# Patient Record
Sex: FEMALE | Race: WHITE | NOT HISPANIC OR LATINO | URBAN - METROPOLITAN AREA
[De-identification: names, ages, dates, MRNs, and addresses within clinical notes are randomized per-mention and may not be internally consistent; named-entity substitution may affect disease eponyms.]

---

## 2022-10-27 ENCOUNTER — OFFICE VISIT (OUTPATIENT)
Dept: OBGYN CLINIC | Facility: CLINIC | Age: 27
End: 2022-10-27

## 2022-10-27 VITALS
WEIGHT: 211.6 LBS | HEIGHT: 63 IN | BODY MASS INDEX: 37.49 KG/M2 | SYSTOLIC BLOOD PRESSURE: 104 MMHG | DIASTOLIC BLOOD PRESSURE: 80 MMHG

## 2022-10-27 DIAGNOSIS — Z80.3 FAMILY HISTORY OF BREAST CANCER: ICD-10-CM

## 2022-10-27 DIAGNOSIS — Z87.42 HISTORY OF ABNORMAL CERVICAL PAP SMEAR: ICD-10-CM

## 2022-10-27 DIAGNOSIS — Z01.419 ROUTINE GYNECOLOGICAL EXAMINATION: Primary | ICD-10-CM

## 2022-10-27 NOTE — PROGRESS NOTES
Assessment   33 y o  Melida Bello presenting for annual exam      Plan   Diagnoses and all orders for this visit:    Routine gynecological examination  Normal findings on routine exam   Encouraged 150 min of exercise per week  Reviewed balanced diet  Breast awareness/SBE encouraged     Condoms recommended for pregnancy prevention  Declines alternate method at this time  Prenatal vitamin encouraged if planning to continue withdrawal      Family history of breast cancer  Mom with breast cancer at age 48 and recurrence at age 61  Currently stage 4  She has had negative genetic testing  Breast awareness and SBE encouraged  History of abnormal pap  10/26/21 NILM  9/11/2020 NILM  6/21/2019 NILM  3/2019 ASCUS/HPV neg  11/13/2018 NILM  7/17/2018 - colposcopy - neg  6/18/2018 LSIL  6/12/2017 NILM    Had Gardasil  Nonsmoker  Pap due 2024    Mammogram - routine screening at age 37    RTO one year for yearly exam or sooner as needed  __________________________________________________________________    Subjective     Shiva Leavitt is a 33 y o  Melida Bello presenting for annual exam  She is without complaint and does not want STD testing today  Reva Ramirez 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  She is from [place]  Moved to [facility] where her boyfriend is from  They have been together x 1 year  Her mother was unfortunately found to have a recurrence of breast cancer - currently Stage 4  SCREENING  Last Pap: 10/27/2022 NILM  Last Mammo: n/a  Last Colonoscopy: n/a    GYN  Periods are q 30 days, lasting 2 days  Not heavy  Dysmenorrhea:moderate, occurring premenstrually  Not requiring tx     Cyclic symptoms include breast tenderness and moodiness    Sexually active: Yes - single partner - male  Contraception: withdrawal   Hx STI: denies     Hx Abnormal pap:   10/26/21 NILM  9/11/2020 NILM  6/21/2019 NILM  3/2019 ASCUS/HPV neg  11/13/2018 NILM  7/17/2018 - colposcopy - neg  2018 LSIL  2017 NILM  We reviewed ASCCP guidelines for Pap testing today  Gardasil: She has completed the Gardasil series  Denies vaginal discharge, itching, odor, dyspareunia, pelvic pain and vulvar/vaginal symptoms      OB         Complaints: denies   Denies urgency, frequency, hematuria, leakage / change in stream, difficulty urinating  BREAST  Complaints: denies   Denies: breast lump, breast tenderness, nipple discharge, skin color change, and skin lesion(s)  Personal hx: denies      Pertinent Family Hx:   Family hx of breast cancer: Mom (age 48 - then recurrence at 61)  Family hx of ovarian cancer: no  Family hx of colon cancer: no      GENERAL  PMH reviewed/updated and is as below  Past Medical History:   Diagnosis Date   • Abnormal Pap smear of cervix        History reviewed  No pertinent surgical history  No current outpatient medications on file      No Known Allergies    Social History     Socioeconomic History   • Marital status: Single     Spouse name: Not on file   • Number of children: Not on file   • Years of education: Not on file   • Highest education level: Not on file   Occupational History   • Not on file   Tobacco Use   • Smoking status: Never Smoker   • Smokeless tobacco: Never Used   Substance and Sexual Activity   • Alcohol use: Yes     Comment: Drink maybe once a month   • Drug use: Never   • Sexual activity: Yes     Partners: Male     Birth control/protection: None   Other Topics Concern   • Not on file   Social History Narrative   • Not on file     Social Determinants of Health     Financial Resource Strain: Not on file   Food Insecurity: Not on file   Transportation Needs: Not on file   Physical Activity: Not on file   Stress: Not on file   Social Connections: Not on file   Intimate Partner Violence: Not on file   Housing Stability: Not on file       Review of Systems     ROS:  Constitutional: Negative for fatigue and unexpected weight change  Respiratory: Negative for cough and shortness of breath  Cardiovascular: Negative for chest pain and palpitations  Gastrointestinal: Negative for abdominal pain and change in bowel habits  Breasts:  Negative, other than as noted above  Genitourinary: Negative, other than as noted above  Psychiatric: Negative for mood difficulties  Objective      /80 (BP Location: Left arm, Patient Position: Sitting, Cuff Size: Large)   Ht 5' 2 5" (1 588 m)   Wt 96 kg (211 lb 9 6 oz)   LMP 10/09/2022   BMI 38 09 kg/m²     Physical Examination:    Patient appears well and is not in distress  Neck is supple without masses  Breasts are symmetrical without mass, tenderness, nipple discharge, skin changes or adenopathy  Abdomen is soft and nontender without masses  External genitals are normal without lesions or rashes  Urethral meatus and urethra are normal  Bladder is normal to palpation  Vagina is normal without discharge or bleeding  Cervix is normal without discharge or lesion  Uterus is normal, mobile, nontender without palpable mass  Adnexa are normal, nontender, without palpable mass

## 2024-04-18 NOTE — PROGRESS NOTES
Assessment/Plan:    Encounter for gynecological examination (general) (routine) without abnormal findings  28 yo G0, new to [facility], here for overdue well check and no menses since Jan. H/o irregular menses.  Same partner x 2 years, open to pregnancy.   No other complaints    Normal breast and pelvic exams. Pap done.   Mammo at 41, SBE reviewed.   Will check TSH, PRL, HCG and rx 5 days of provera when resulted.     Family history of breast cancer - Mother @53, reoccurenc. Mother BRCA neg  Will attempt to upload mother's genetic report for review.   SBE.   Mammo @41, prn.  Could consider q 6 month breast exam in future.       Diagnoses and all orders for this visit:    Encounter for gynecological examination (general) (routine) without abnormal findings    Amenorrhea  -     POCT urine HCG  -     TSH + Free T4; Future  -     hCG, quantitative; Future  -     Prolactin; Future  -     TSH + Free T4  -     hCG, quantitative  -     Prolactin    Screening for malignant neoplasm of the cervix  -     IGP, rfx Aptima HPV ASCU    Other orders  -     Liquid-based pap, screening          Subjective:      Patient ID: Divya Scott is a 29 y.o. female.    HPI here for well check, amenorrheic.    The following portions of the patient's history were reviewed and updated as appropriate: She  has a past medical history of Abnormal Pap smear of cervix and Gardasil complete.  She   Patient Active Problem List    Diagnosis Date Noted    Encounter for gynecological examination (general) (routine) without abnormal findings 04/22/2024    Family history of breast cancer - Mother @53, reoccurenc. Mother BRCA neg 10/27/2022    Amblyopia 08/13/2003     She  has a past surgical history that includes Ankle surgery (Right).  Her family history includes BRCA1 Negative in her mother; Breast cancer (age of onset: 53) in her mother; Diabetes in her maternal grandmother; Heart disease in her maternal grandfather; Migraines in her mother; Stroke in her  paternal grandmother; Thyroid disease in her father and mother.  She  reports that she has never smoked. She has never used smokeless tobacco. She reports current alcohol use. She reports that she does not use drugs.  No current outpatient medications on file.     No current facility-administered medications for this visit.     She is allergic to camphor, diphenhydramine-pe-apap, and diphenhydramine-zinc acetate..    Review of Systems  amenorrhea, irregular cycles.   No breast, bladder, bowel changes. No new persistent pain, bloating, early satiety or pelvic pressure  No androgen excess symptoms    Objective:      /70 (BP Location: Right arm, Patient Position: Sitting, Cuff Size: Large)   Wt 97.7 kg (215 lb 6.4 oz)   BMI 38.77 kg/m²     Declined chaperone       Physical Exam    General appearance: no distress, pleasant  Neck: thyroid without nodules or thyromegaly, no palpable adenopathy  Lymph nodes: no palpable adenopathy  Breasts: pendulous, no masses, nodes or skin changes  Abdomen: soft, non tender, no palpable masses  Pelvic exam: normal external genitalia, urethral meatus normal, vagina without lesions, cervix without lesions, uterus small, non tender, no adnexal masses, non tender  Rectal exam: deferred

## 2024-04-22 ENCOUNTER — OFFICE VISIT (OUTPATIENT)
Dept: OBGYN CLINIC | Facility: CLINIC | Age: 29
End: 2024-04-22
Payer: COMMERCIAL

## 2024-04-22 VITALS — DIASTOLIC BLOOD PRESSURE: 70 MMHG | WEIGHT: 215.4 LBS | BODY MASS INDEX: 38.77 KG/M2 | SYSTOLIC BLOOD PRESSURE: 110 MMHG

## 2024-04-22 DIAGNOSIS — Z12.4 SCREENING FOR MALIGNANT NEOPLASM OF THE CERVIX: ICD-10-CM

## 2024-04-22 DIAGNOSIS — Z80.3 FAMILY HISTORY OF BREAST CANCER: ICD-10-CM

## 2024-04-22 DIAGNOSIS — Z01.419 ENCOUNTER FOR GYNECOLOGICAL EXAMINATION (GENERAL) (ROUTINE) WITHOUT ABNORMAL FINDINGS: Primary | ICD-10-CM

## 2024-04-22 DIAGNOSIS — N91.2 AMENORRHEA: ICD-10-CM

## 2024-04-22 LAB — SL AMB POCT URINE HCG: NORMAL

## 2024-04-22 PROCEDURE — 81025 URINE PREGNANCY TEST: CPT | Performed by: OBSTETRICS & GYNECOLOGY

## 2024-04-22 PROCEDURE — S0612 ANNUAL GYNECOLOGICAL EXAMINA: HCPCS | Performed by: OBSTETRICS & GYNECOLOGY

## 2024-04-22 NOTE — PATIENT INSTRUCTIONS
Return to office in one year unless having any problems such as breast changes, bleeding, new persistent pain, new progressive bloating, new problems eating (getting full to quickly) or new constant urinary pressure that does not resolve in one week.    You should have a cycle at least every 3 months, if you don't call the office.   Please try to get your mother's genetic report and send it to me.   When your labs are back, we can send an rx for the medication to make your period start.

## 2024-04-22 NOTE — ASSESSMENT & PLAN NOTE
Will attempt to upload mother's genetic report for review.   SBE.   Mammo @41, prn.  Could consider q 6 month breast exam in future.

## 2024-04-22 NOTE — ASSESSMENT & PLAN NOTE
28 yo G0, new to [place], here for overdue well check and no menses since Jan. H/o irregular menses.  Same partner x 2 years, open to pregnancy.   No other complaints    Normal breast and pelvic exams. Pap done.   Mammo at 41, SBE reviewed.   Will check TSH, PRL, HCG and rx 5 days of provera when resulted.

## 2024-04-26 LAB
CYTOLOGIST CVX/VAG CYTO: NORMAL
DX ICD CODE: NORMAL
OTHER STN SPEC: NORMAL
OTHER STN SPEC: NORMAL
PATH REPORT.FINAL DX SPEC: NORMAL
SL AMB NOTE:: NORMAL
SL AMB SPECIMEN ADEQUACY: NORMAL
SL AMB TEST METHODOLOGY: NORMAL

## 2024-05-07 ENCOUNTER — TELEPHONE (OUTPATIENT)
Dept: OBGYN CLINIC | Facility: CLINIC | Age: 29
End: 2024-05-07

## 2024-05-07 DIAGNOSIS — N91.2 AMENORRHEA: Primary | ICD-10-CM

## 2024-05-07 LAB
HCG INTACT+B SERPL-ACNC: <1 MIU/ML
PROLACTIN SERPL-MCNC: 12.9 NG/ML (ref 4.8–33.4)
T4 FREE SERPL DIALY-MCNC: 1.1 NG/DL
TSH SERPL-ACNC: 1.3 UU/ML

## 2024-05-07 RX ORDER — MEDROXYPROGESTERONE ACETATE 5 MG/1
5 TABLET ORAL DAILY
Qty: 5 TABLET | Refills: 0 | Status: SHIPPED | OUTPATIENT
Start: 2024-05-07

## 2024-05-07 NOTE — TELEPHONE ENCOUNTER
Please call patient with normal blood work - negative HCG, normal TSH and prolactin. Provera 5 mg daily for 5 days sent to pharmacy as discussed.   If no period 5-7 days after last pill should call office.   Can cycle every 3 months if no spontaneous menses. Should contact office for plan if needed.   Thanks.

## 2024-05-07 NOTE — TELEPHONE ENCOUNTER
Patient did not answer return call.  Left detailed message with all results and with Rx instructions.  Advised call back if questions.

## 2024-05-22 PROBLEM — Z01.419 ENCOUNTER FOR GYNECOLOGICAL EXAMINATION (GENERAL) (ROUTINE) WITHOUT ABNORMAL FINDINGS: Status: RESOLVED | Noted: 2024-04-22 | Resolved: 2024-05-22

## 2024-10-09 ENCOUNTER — OFFICE VISIT (OUTPATIENT)
Dept: FAMILY MEDICINE CLINIC | Facility: HOSPITAL | Age: 29
End: 2024-10-09
Payer: COMMERCIAL

## 2024-10-09 VITALS
DIASTOLIC BLOOD PRESSURE: 76 MMHG | HEART RATE: 75 BPM | OXYGEN SATURATION: 97 % | BODY MASS INDEX: 39.01 KG/M2 | HEIGHT: 62 IN | SYSTOLIC BLOOD PRESSURE: 130 MMHG | WEIGHT: 212 LBS

## 2024-10-09 DIAGNOSIS — R21 FACIAL RASH: Primary | ICD-10-CM

## 2024-10-09 DIAGNOSIS — Z76.89 ESTABLISHING CARE WITH NEW DOCTOR, ENCOUNTER FOR: ICD-10-CM

## 2024-10-09 DIAGNOSIS — Z13.0 SCREENING FOR IRON DEFICIENCY ANEMIA: ICD-10-CM

## 2024-10-09 DIAGNOSIS — Z11.59 NEED FOR HEPATITIS C SCREENING TEST: ICD-10-CM

## 2024-10-09 DIAGNOSIS — Z13.1 SCREENING FOR DIABETES MELLITUS: ICD-10-CM

## 2024-10-09 DIAGNOSIS — Z80.3 FAMILY HISTORY OF BREAST CANCER: ICD-10-CM

## 2024-10-09 DIAGNOSIS — Z11.4 SCREENING FOR HIV (HUMAN IMMUNODEFICIENCY VIRUS): ICD-10-CM

## 2024-10-09 DIAGNOSIS — R76.8 ELEVATED ANTINUCLEAR ANTIBODY (ANA) LEVEL: ICD-10-CM

## 2024-10-09 DIAGNOSIS — R79.82 ELEVATED C-REACTIVE PROTEIN (CRP): ICD-10-CM

## 2024-10-09 DIAGNOSIS — Z13.220 SCREENING FOR HYPERLIPIDEMIA: ICD-10-CM

## 2024-10-09 PROBLEM — S82.891A CLOSED RIGHT ANKLE FRACTURE: Status: RESOLVED | Noted: 2024-10-09 | Resolved: 2024-10-09

## 2024-10-09 PROBLEM — R87.610 ATYPICAL SQUAMOUS CELLS OF UNDETERMINED SIGNIFICANCE (ASCUS) ON PAPANICOLAOU SMEAR OF CERVIX: Status: RESOLVED | Noted: 2019-03-25 | Resolved: 2024-10-09

## 2024-10-09 PROBLEM — S82.891A CLOSED RIGHT ANKLE FRACTURE: Status: ACTIVE | Noted: 2024-10-09

## 2024-10-09 PROBLEM — B97.7 HUMAN PAPILLOMA VIRUS INFECTION: Status: RESOLVED | Noted: 2018-07-24 | Resolved: 2024-10-09

## 2024-10-09 PROBLEM — R87.610 ATYPICAL SQUAMOUS CELLS OF UNDETERMINED SIGNIFICANCE (ASCUS) ON PAPANICOLAOU SMEAR OF CERVIX: Status: ACTIVE | Noted: 2019-03-25

## 2024-10-09 PROBLEM — R87.612 LOW GRADE SQUAMOUS INTRAEPITHELIAL LESION (LGSIL) ON PAPANICOLAOU SMEAR OF CERVIX: Status: ACTIVE | Noted: 2018-07-05

## 2024-10-09 PROBLEM — B97.7 HUMAN PAPILLOMA VIRUS INFECTION: Status: ACTIVE | Noted: 2018-07-24

## 2024-10-09 PROBLEM — R87.612 LOW GRADE SQUAMOUS INTRAEPITHELIAL LESION (LGSIL) ON PAPANICOLAOU SMEAR OF CERVIX: Status: RESOLVED | Noted: 2018-07-05 | Resolved: 2024-10-09

## 2024-10-09 PROCEDURE — 99204 OFFICE O/P NEW MOD 45 MIN: CPT | Performed by: STUDENT IN AN ORGANIZED HEALTH CARE EDUCATION/TRAINING PROGRAM

## 2024-10-09 NOTE — PROGRESS NOTES
[facility]   Viridiana Ferrell DO    Assessment/Plan:      Diagnosis ICD-10-CM Associated Orders   1. Facial rash  R21 Ambulatory Referral to Dermatology      2. Establishing care with new doctor, encounter for  Z76.89       3. Family history of breast cancer - Mother @53, reoccurenc. Mother BRCA neg  Z80.3       4. Elevated C-reactive protein (CRP)  R79.82 High sensitivity CRP     High sensitivity CRP      5. Screening for iron deficiency anemia  Z13.0 CBC     CBC      6. Screening for diabetes mellitus  Z13.1 Comprehensive metabolic panel     Comprehensive metabolic panel      7. Screening for hyperlipidemia  Z13.220 Lipid Panel with Direct LDL reflex     Lipid Panel with Direct LDL reflex      8. Elevated antinuclear antibody (MASSIEL) level  R76.8 MASSIEL w/Reflex     MASSIEL w/Reflex      9. Need for hepatitis C screening test  Z11.59 Hepatitis C antibody     Hepatitis C antibody      10. Screening for HIV (human immunodeficiency virus)  Z11.4 HIV 1/2 AG/AB W REFLEX [facility] only     HIV 1/2 AG/AB W REFLEX [facility] only        Established here today. Can return as needed for acute issues.   Not getting the more red rash right now. Never seen by derm, ref given.   Re-assess labwork, including CRP & MASSIEL which were elevated before. No autoimmune fam hx.   Return in about 3 months (around 1/9/2025) for Yrly Physical.  Patient may call or return to office with any questions or concerns.   ______________________________________________________________________  Subjective:     Patient ID: Divya Scott is a 29 y.o. female.  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   From [place] & BF from [place].     R ankle Fx June 2023 - surgery in June for hardware & then in Nov for removal & achilles lengthening.    [facility]     Seeing [facility] - irregular menses,  and never needed med for menses.   Getting more regular now.     Seen by rheum for redness in face. Worse in afternoon, can be sitting or resting & it just happens. Gets warm & red, rest of her feels normal.   No fam hx of autoimmune conditions.   Never seen by derm.     Labs done with [facility] in 2023 - CRP 5.1, MASSIEL +  SSA/SSB, RF, ESR, CCP all negative.   Uric acid 5.4  TSH & t4 normal in spring.     Wt Readings from Last 3 Encounters:   10/09/24 96.2 kg (212 lb)   04/22/24 97.7 kg (215 lb 6.4 oz)   10/27/22 96 kg (211 lb 9.6 oz)     Temp Readings from Last 3 Encounters:   No data found for Temp     BP Readings from Last 3 Encounters:   10/09/24 130/76   04/22/24 110/70   10/27/22 104/80     Pulse Readings from Last 3 Encounters:   10/09/24 75      The following portions of the patient's history were reviewed and updated as appropriate: allergies, current medications, past medical history, and problem list.    Review of Systems   Constitutional:  Negative for chills and fever.   Respiratory:  Positive for cough (feels like she is getting over a cold). Negative for shortness of breath.    Cardiovascular:  Negative for chest pain and palpitations.   Skin:  Positive for rash.   Neurological:  Negative for dizziness, light-headedness and headaches.       Objective:      Vitals:    10/09/24 1618   BP: 130/76   Pulse: 75   SpO2: 97%      Physical Exam  Vitals and nursing note reviewed.   Constitutional:       General: She is not in acute distress.     Appearance: Normal appearance. She is obese. She is not ill-appearing.   HENT:      Head: Normocephalic and atraumatic.   Eyes:      General: No scleral icterus.        Right eye: No discharge.         Left eye: No discharge.   Cardiovascular:      Rate and Rhythm: Normal rate and regular rhythm.      Pulses: Normal pulses.      Heart sounds: Normal heart sounds. No murmur heard.  Pulmonary:      Effort: Pulmonary effort is normal. No respiratory distress.      Breath sounds:  "Normal breath sounds. No stridor. No wheezing.   Musculoskeletal:      Cervical back: Normal range of motion and neck supple. No rigidity or tenderness.      Right lower leg: No edema.      Left lower leg: No edema.   Lymphadenopathy:      Cervical: No cervical adenopathy.   Skin:     Findings: Rash (light pink over b/l cheeks, small areas of acne on full face, but very minimal) present.   Neurological:      Mental Status: She is alert and oriented to person, place, and time.      Gait: Gait normal.   Psychiatric:         Mood and Affect: Mood normal.         Behavior: Behavior normal.         Thought Content: Thought content normal.         Judgment: Judgment normal.           Portions of the record may have been created with voice recognition software. Occasional wrong word or \"sound alike\" substitutions may have occurred due to the inherent limitations of voice recognition software. Please review the chart carefully and recognize, using context, where substitutions/typographical errors may have occurred.     "

## 2025-01-13 ENCOUNTER — OFFICE VISIT (OUTPATIENT)
Dept: FAMILY MEDICINE CLINIC | Facility: HOSPITAL | Age: 30
End: 2025-01-13
Payer: COMMERCIAL

## 2025-01-13 VITALS
WEIGHT: 216.2 LBS | DIASTOLIC BLOOD PRESSURE: 70 MMHG | HEART RATE: 84 BPM | TEMPERATURE: 97.8 F | OXYGEN SATURATION: 99 % | BODY MASS INDEX: 38.31 KG/M2 | HEIGHT: 63 IN | SYSTOLIC BLOOD PRESSURE: 110 MMHG

## 2025-01-13 DIAGNOSIS — B30.9 ACUTE VIRAL CONJUNCTIVITIS OF RIGHT EYE: Primary | ICD-10-CM

## 2025-01-13 PROCEDURE — 99213 OFFICE O/P EST LOW 20 MIN: CPT

## 2025-01-13 RX ORDER — KETOTIFEN FUMARATE 0.35 MG/ML
1 SOLUTION/ DROPS OPHTHALMIC 2 TIMES DAILY PRN
Qty: 5 ML | Refills: 0 | Status: SHIPPED | OUTPATIENT
Start: 2025-01-13

## 2025-01-13 RX ORDER — OXYMETAZOLINE HYDROCHLORIDE 30 G/1
CREAM TOPICAL
COMMUNITY

## 2025-01-13 NOTE — PROGRESS NOTES
"Name: Divya Scott      : 1995      MRN: [id number]  Encounter Provider: Boone Baum MD  Encounter Date: 2025   Encounter department: [facility] [address]  :  Assessment & Plan  Acute viral conjunctivitis of right eye  Erythema noted on right medial conjunctiva as well as left medial conjunctiva, no purulent discharge, will treat symptomatically for viral conjunctivitis, no concern for bacterial conjunctivitis at this time.  Advised to go to ED if she develops any signs significant eye pain or changes in her vision.  Kennae handout provided  Orders:    Ketotifen Fumarate (ZADITOR) 0.035 % ophthalmic solution; Administer 1 drop to both eyes 2 (two) times a day as needed (eye irritation)           History of Present Illness     HPI  Pink eye, right, started yesterday, spreading at , denies purulent discharge, visual changes or significant eye pain beyond irritation    Review of Systems   Constitutional:  Negative for chills and fever.   Eyes:  Positive for redness and itching. Negative for photophobia, pain, discharge and visual disturbance.       Objective   /70   Pulse 84   Temp 97.8 °F (36.6 °C) (Oral)   Ht 5' 2.5\" (1.588 m)   Wt 98.1 kg (216 lb 3.2 oz)   SpO2 99%   BMI 38.91 kg/m²      Physical Exam  Constitutional:       General: She is not in acute distress.     Appearance: She is not ill-appearing, toxic-appearing or diaphoretic.   Eyes:      General:         Right eye: No discharge.         Left eye: No discharge.      Comments: Bilateral erythematous conjunctiva without discharge   Neurological:      Mental Status: She is alert and oriented to person, place, and time.   Psychiatric:         Mood and Affect: Mood normal.         Behavior: Behavior normal.         "

## 2025-01-29 ENCOUNTER — RA CDI HCC (OUTPATIENT)
Dept: OTHER | Facility: HOSPITAL | Age: 30
End: 2025-01-29

## 2025-02-03 LAB
ALBUMIN SERPL-MCNC: 4.5 G/DL (ref 4–5)
ALP SERPL-CCNC: 87 IU/L (ref 44–121)
ALT SERPL-CCNC: 14 IU/L (ref 0–32)
ANA SER QL: NEGATIVE
AST SERPL-CCNC: 15 IU/L (ref 0–40)
BILIRUB SERPL-MCNC: 0.7 MG/DL (ref 0–1.2)
BUN SERPL-MCNC: 9 MG/DL (ref 6–20)
BUN/CREAT SERPL: 14 (ref 9–23)
CALCIUM SERPL-MCNC: 9.4 MG/DL (ref 8.7–10.2)
CHLORIDE SERPL-SCNC: 104 MMOL/L (ref 96–106)
CHOLEST SERPL-MCNC: 176 MG/DL (ref 100–199)
CO2 SERPL-SCNC: 20 MMOL/L (ref 20–29)
CREAT SERPL-MCNC: 0.66 MG/DL (ref 0.57–1)
CRP SERPL HS-MCNC: 2.02 MG/L (ref 0–3)
EGFR: 122 ML/MIN/1.73
ERYTHROCYTE [DISTWIDTH] IN BLOOD BY AUTOMATED COUNT: 11.8 % (ref 11.7–15.4)
GLOBULIN SER-MCNC: 2.3 G/DL (ref 1.5–4.5)
GLUCOSE SERPL-MCNC: 91 MG/DL (ref 70–99)
HCT VFR BLD AUTO: 42.9 % (ref 34–46.6)
HCV AB S/CO SERPL IA: NON REACTIVE
HDLC SERPL-MCNC: 36 MG/DL
HGB BLD-MCNC: 14.5 G/DL (ref 11.1–15.9)
HIV 1+2 AB+HIV1 P24 AG SERPL QL IA: NON REACTIVE
LDLC SERPL CALC-MCNC: 113 MG/DL (ref 0–99)
LDLC/HDLC SERPL: 3.1 RATIO (ref 0–3.2)
MCH RBC QN AUTO: 29.6 PG (ref 26.6–33)
MCHC RBC AUTO-ENTMCNC: 33.8 G/DL (ref 31.5–35.7)
MCV RBC AUTO: 88 FL (ref 79–97)
PLATELET # BLD AUTO: 251 X10E3/UL (ref 150–450)
POTASSIUM SERPL-SCNC: 4.1 MMOL/L (ref 3.5–5.2)
PROT SERPL-MCNC: 6.8 G/DL (ref 6–8.5)
RBC # BLD AUTO: 4.9 X10E6/UL (ref 3.77–5.28)
SL AMB VLDL CHOLESTEROL CALC: 27 MG/DL (ref 5–40)
SODIUM SERPL-SCNC: 139 MMOL/L (ref 134–144)
TRIGL SERPL-MCNC: 149 MG/DL (ref 0–149)
WBC # BLD AUTO: 5.1 X10E3/UL (ref 3.4–10.8)

## 2025-04-21 ENCOUNTER — RA CDI HCC (OUTPATIENT)
Dept: OTHER | Facility: HOSPITAL | Age: 30
End: 2025-04-21

## 2025-04-28 ENCOUNTER — OFFICE VISIT (OUTPATIENT)
Dept: FAMILY MEDICINE CLINIC | Facility: HOSPITAL | Age: 30
End: 2025-04-28
Payer: COMMERCIAL

## 2025-04-28 VITALS
WEIGHT: 218 LBS | OXYGEN SATURATION: 97 % | BODY MASS INDEX: 38.62 KG/M2 | DIASTOLIC BLOOD PRESSURE: 86 MMHG | HEART RATE: 80 BPM | SYSTOLIC BLOOD PRESSURE: 120 MMHG | HEIGHT: 63 IN

## 2025-04-28 DIAGNOSIS — Z00.00 ANNUAL PHYSICAL EXAM: Primary | ICD-10-CM

## 2025-04-28 DIAGNOSIS — Z02.1 ENCOUNTER FOR PRE-EMPLOYMENT HEALTH SCREENING EXAMINATION: ICD-10-CM

## 2025-04-28 PROCEDURE — 99395 PREV VISIT EST AGE 18-39: CPT | Performed by: STUDENT IN AN ORGANIZED HEALTH CARE EDUCATION/TRAINING PROGRAM

## 2025-04-28 NOTE — PROGRESS NOTES
Adult Annual Physical  Name: Divya Scott      : 1995      MRN: [id number]  Encounter Provider: Viridiana Ferrell DO  Encounter Date: 2025   Encounter department: [facility] [address]    :  Assessment & Plan  Annual physical exam  Topics as below.   Return in about 1 year (around 2026) for Yrly Physical.       Encounter for pre-employment health screening examination    Orders:  •  Measles/Mumps/Rubella Immunity; Future      Preventive Screenings:  - Diabetes Screening: screening up-to-date  - Cholesterol Screening: screening up-to-date   - Hepatitis C screening: screening up-to-date   - HIV screening: screening up-to-date   - Cervical cancer screening: screening up-to-date   - Colon cancer screening: screening not indicated   - Lung cancer screening: screening not indicated     Immunizations:  - Immunizations due: Influenza  - The patient declines recommended vaccines currently despite my recommendations      Counseling/Anticipatory Guidance:  - Alcohol: discussed moderation in alcohol intake and recommendations for healthy alcohol use.   - Drug use: discussed harms of illicit drug use and how it can negatively impact mental/physical health.   - Tobacco use: discussed harms of tobacco use and management options for quitting.   - Dental health: discussed importance of regular tooth brushing, flossing, and dental visits.   - Sexual health: discussed sexually transmitted diseases, partner selection, use of condoms, avoidance of unintended pregnancy, and contraceptive alternatives.   - Diet: discussed recommendations for a healthy/well-balanced diet.   - Exercise: the importance of regular exercise/physical activity was discussed. Recommend exercise 3-5 times per week for at least 30 minutes.   - Injury prevention: discussed safety/seat belts, safety helmets, smoke detectors, carbon monoxide detectors, and smoking near bedding or upholstery.       Depression Screening and  Follow-up Plan: Patient was screened for depression during today's encounter. They screened negative with a PHQ-2 score of 0.          History of Present Illness     Adult Annual Physical:  Patient presents for annual physical. Starting 2nd job in [place].   Maybe allergies or virus last week, ST a few days, then cough. .     Diet and Physical Activity:  - Diet/Nutrition: no special diet. 32 oz tumbler, tries to do one or more of them  - Exercise: walking. 1x a week, trying to increase    Depression Screening:  - PHQ-2 Score: 0    General Health:  - Sleep: sleeps well and 7-8 hours of sleep on average.  - Hearing: normal hearing bilateral ears.  - Vision: wears contacts, wears glasses and most recent eye exam < 1 year ago.  - Dental: regular dental visits and brushes teeth twice daily.    /GYN Health:  - Follows with GYN: yes.   - Last menstrual cycle: 4/8/2025.   - History of STDs: no  - Contraception:. One male partner, okay with baby if pregnancy comes          Review of Systems   Constitutional:  Negative for chills and fever.   HENT:  Positive for sneezing (allergies).    Eyes:  Positive for itching (allergies).   Respiratory:  Negative for cough and shortness of breath.    Cardiovascular:  Negative for chest pain and palpitations.   Gastrointestinal:  Negative for constipation and diarrhea.   Genitourinary:  Negative for dysuria and flank pain.   Neurological:  Negative for dizziness, light-headedness and headaches.       Current Outpatient Medications on File Prior to Visit   Medication Sig Dispense Refill   • [DISCONTINUED] Ketotifen Fumarate (ZADITOR) 0.035 % ophthalmic solution Administer 1 drop to both eyes 2 (two) times a day as needed (eye irritation) (Patient not taking: Reported on 4/28/2025) 5 mL 0   • [DISCONTINUED] Oxymetazoline HCl (Rhofade) 1 % CREA Apply topically Daily at 2am (Patient not taking: Reported on 4/28/2025)       No current facility-administered medications on file  "prior to visit.      Social History     Tobacco Use   • Smoking status: Never   • Smokeless tobacco: Never   Vaping Use   • Vaping status: Never Used   Substance and Sexual Activity   • Alcohol use: Yes     Comment: Drink maybe once a month   • Drug use: Never   • Sexual activity: Yes     Partners: Male     Birth control/protection: None     Objective   /86   Pulse 80   Ht 5' 2.5\" (1.588 m)   Wt 98.9 kg (218 lb)   SpO2 97%   BMI 39.24 kg/m²     Physical Exam  Vitals and nursing note reviewed.   Constitutional:       General: She is not in acute distress.     Appearance: Normal appearance. She is well-developed. She is obese. She is not ill-appearing.   HENT:      Head: Normocephalic and atraumatic.      Right Ear: Tympanic membrane, ear canal and external ear normal. There is no impacted cerumen.      Left Ear: Tympanic membrane, ear canal and external ear normal. There is no impacted cerumen.      Nose: Nose normal. No congestion or rhinorrhea.      Mouth/Throat:      Mouth: Mucous membranes are moist.      Pharynx: Oropharynx is clear. No oropharyngeal exudate or posterior oropharyngeal erythema.   Eyes:      General: No scleral icterus.        Right eye: No discharge.         Left eye: No discharge.      Conjunctiva/sclera: Conjunctivae normal.   Neck:      Comments: No thyroid nodules or thyromegaly.  Cardiovascular:      Rate and Rhythm: Normal rate and regular rhythm.      Pulses: Normal pulses.      Heart sounds: Normal heart sounds. No murmur heard.  Pulmonary:      Effort: Pulmonary effort is normal. No respiratory distress.      Breath sounds: Normal breath sounds. No wheezing.   Abdominal:      Tenderness: There is no right CVA tenderness or left CVA tenderness.   Musculoskeletal:         General: Normal range of motion.      Cervical back: Normal range of motion and neck supple. No rigidity or tenderness.      Right lower leg: No edema.      Left lower leg: No edema.   Lymphadenopathy:      " Cervical: No cervical adenopathy.   Skin:     General: Skin is warm and dry.      Capillary Refill: Capillary refill takes less than 2 seconds.      Findings: No erythema or rash.   Neurological:      Mental Status: She is alert and oriented to person, place, and time.      Gait: Gait normal.   Psychiatric:         Mood and Affect: Mood normal.         Behavior: Behavior normal.         Thought Content: Thought content normal.         Judgment: Judgment normal.

## 2025-04-28 NOTE — LETTER
April 28, 2025     Patient: Divya Scott  YOB: 1995  Date of Visit: 4/28/2025      To Whom it May Concern:    Divya Scott is under my professional care. Divya was seen in my office on 4/28/2025. Divya had her annual physical done today.     If you have any questions or concerns, please don't hesitate to call.         Sincerely,          Viridiana Ferrell, DO        CC: No Recipients

## 2025-04-30 NOTE — PROGRESS NOTES
Name: Divya Scott      : 1995      MRN: [id number]  Encounter Provider: Deya Gautam MD  Encounter Date: 2025   Encounter department: [facility] [place]  :  Assessment & Plan  Encounter for gynecological examination (general) (routine) without abnormal findings  Here for well check, no breast or gyn concerns.   Mostly monthly cycles q 4-7 weeks, no dysm/men/IMB.   Same partner, withdraw. Not interested in other methods.     Normal breast and pelvic exams.   Normal pap last year, due . Declined cultures.       Family history of breast cancer - Mother @53, reoccurenc. Mother BRCA neg         History of Present Illness   HPI  Divya Scott is a 30 y.o. female who presents for well check.    Review of Systems  No breast, bladder, bowel changes. No new persistent pain, bloating, early satiety or pelvic pressure  No menorrhagia, dysmenorrhea, intermenstrual bleeding    Past Medical History   Past Medical History:   Diagnosis Date    Abnormal Pap smear of cervix     Gardasil complete      Past Surgical History:   Procedure Laterality Date    ANKLE SURGERY Right     2023 x 2     Family History   Problem Relation Age of Onset    Breast cancer Mother         Had 1st time at age 53. Curently had Metastatic Breast Cancer (diagnosed at age 63)    Migraines Mother     Thyroid disease Mother         Nodules on Thyroid, used Radioactive iodine to shrink, came back after giving birth to child, removed after finding small cancer on it (no treatment), on Synthroid since    BRCA1 Negative Mother         2022 Genetics report reviewed. 48 GENE [employer] multigene panel without mutations.    Thyroid disease Father         Low Thyroid    Diabetes Maternal Grandmother         Type 2    Heart disease Maternal Grandfather         AFib    Stroke Paternal Grandmother          of a stroke @ 97    Uterine cancer Neg Hx     Ovarian cancer Neg Hx     Colon cancer Neg Hx       reports that she has never  "smoked. She has never used smokeless tobacco. She reports current alcohol use. She reports that she does not use drugs.  No current outpatient medications  Allergies   Allergen Reactions    Pollen Extract Nasal Congestion    Camphor Rash    Diphenhydramine-Pe-Apap Rash    Diphenhydramine-Zinc Acetate Rash         Objective   /72 (BP Location: Left arm, Patient Position: Sitting, Cuff Size: Large)   Ht 5' 2.5\" (1.588 m)   Wt 99.8 kg (220 lb)   LMP 04/08/2025 (Approximate)   BMI 39.60 kg/m²      Physical Exam  General appearance: no distress, pleasant  Neck: thyroid without nodules or thyromegaly, no palpable adenopathy  Lymph nodes: no palpable adenopathy  Breasts: no masses, nodes or skin changes  Abdomen: soft, non tender, no palpable masses  Pelvic exam: normal external genitalia, urethral meatus normal, vagina without lesions, cervix without lesions, uterus small, non tender, no adnexal masses, non tender  Rectal exam: deferred  "

## 2025-04-30 NOTE — ASSESSMENT & PLAN NOTE
Here for well check, no breast or gyn concerns.   Mostly monthly cycles q 4-7 weeks, no dysm/men/IMB.   Same partner, withdraw. Not interested in other methods.     Normal breast and pelvic exams.   Normal pap last year, due 2027. Declined cultures.

## 2025-05-02 ENCOUNTER — ANNUAL EXAM (OUTPATIENT)
Dept: OBGYN CLINIC | Facility: CLINIC | Age: 30
End: 2025-05-02
Payer: COMMERCIAL

## 2025-05-02 VITALS
DIASTOLIC BLOOD PRESSURE: 72 MMHG | BODY MASS INDEX: 38.98 KG/M2 | HEIGHT: 63 IN | WEIGHT: 220 LBS | SYSTOLIC BLOOD PRESSURE: 112 MMHG

## 2025-05-02 DIAGNOSIS — Z01.419 ENCOUNTER FOR GYNECOLOGICAL EXAMINATION (GENERAL) (ROUTINE) WITHOUT ABNORMAL FINDINGS: Primary | ICD-10-CM

## 2025-05-02 DIAGNOSIS — Z80.3 FAMILY HISTORY OF BREAST CANCER: ICD-10-CM

## 2025-05-02 PROCEDURE — S0612 ANNUAL GYNECOLOGICAL EXAMINA: HCPCS | Performed by: OBSTETRICS & GYNECOLOGY

## 2025-05-02 NOTE — PATIENT INSTRUCTIONS
Return to office in one year unless having any problems such as breast changes, bleeding, new persistent pain, new progressive bloating, new problems eating (getting full to quickly) or new constant urinary pressure that does not resolve in one week.    Call for bleeding that occurs earlier than 21 days, lasts longer than 10 days or for bleeding between cycles. Call if no period in 90 days.

## 2025-05-02 NOTE — LETTER
May 2, 2025     Viridiana Ferrell DO  [address] [place] [id number]    Patient: Divya Scott   YOB: 1995   Date of Visit: 2025       Dear Dr. Viridiana Ferrell DO:    Divya Scott was in today for her annual gyn exam. Below are my notes for this visit.    If you have questions, please do not hesitate to call me. I look forward to following your patient along with you.         Sincerely,        Deya Gautam MD        CC: No Recipients    Deya Gautam MD  2025  8:44 AM  Sign when Signing Visit  Name: Divya Scott      : 1995      MRN: [id number]  Encounter Provider: Deya Gautam MD  Encounter Date: 2025   Encounter department: [facility] [place]  :  Assessment & Plan  Encounter for gynecological examination (general) (routine) without abnormal findings  Here for well check, no breast or gyn concerns.   Mostly monthly cycles q 4-7 weeks, no dysm/men/IMB.   Same partner, withdraw. Not interested in other methods.     Normal breast and pelvic exams.   Normal pap last year, due . Declined cultures.       Family history of breast cancer - Mother @53, reoccurenc. Mother BRCA neg         History of Present Illness  HPI  Divya Scott is a 30 y.o. female who presents for well check.    Review of Systems  No breast, bladder, bowel changes. No new persistent pain, bloating, early satiety or pelvic pressure  No menorrhagia, dysmenorrhea, intermenstrual bleeding    Past Medical History  Past Medical History:   Diagnosis Date   • Abnormal Pap smear of cervix    • Gardasil complete      Past Surgical History:   Procedure Laterality Date   • ANKLE SURGERY Right     2023 x 2     Family History   Problem Relation Age of Onset   • Breast cancer Mother         Had 1st time at age 53. Curently had Metastatic Breast Cancer (diagnosed at age 64)   • Migraines Mother    • Thyroid disease Mother         Nodules on Thyroid, used Radioactive iodine to shrink, came  "back after giving birth to child, removed after finding small cancer on it (no treatment), on Synthroid since   • BRCA1 Negative Mother         2022 Genetics report reviewed. 48 GENE [employer] multigene panel without mutations.   • Thyroid disease Father         Low Thyroid   • Diabetes Maternal Grandmother         Type 2   • Heart disease Maternal Grandfather         AFib   • Stroke Paternal Grandmother          of a stroke @ 97   • Uterine cancer Neg Hx    • Ovarian cancer Neg Hx    • Colon cancer Neg Hx       reports that she has never smoked. She has never used smokeless tobacco. She reports current alcohol use. She reports that she does not use drugs.  No current outpatient medications  Allergies   Allergen Reactions   • Pollen Extract Nasal Congestion   • Camphor Rash   • Diphenhydramine-Pe-Apap Rash   • Diphenhydramine-Zinc Acetate Rash         Objective  /72 (BP Location: Left arm, Patient Position: Sitting, Cuff Size: Large)   Ht 5' 2.5\" (1.588 m)   Wt 99.8 kg (220 lb)   LMP 2025 (Approximate)   BMI 39.60 kg/m²      Physical Exam  General appearance: no distress, pleasant  Neck: thyroid without nodules or thyromegaly, no palpable adenopathy  Lymph nodes: no palpable adenopathy  Breasts: no masses, nodes or skin changes  Abdomen: soft, non tender, no palpable masses  Pelvic exam: normal external genitalia, urethral meatus normal, vagina without lesions, cervix without lesions, uterus small, non tender, no adnexal masses, non tender  Rectal exam: deferred    "

## 2025-07-17 ENCOUNTER — OFFICE VISIT (OUTPATIENT)
Dept: FAMILY MEDICINE CLINIC | Facility: HOSPITAL | Age: 30
End: 2025-07-17
Payer: COMMERCIAL

## 2025-07-17 VITALS
BODY MASS INDEX: 39.34 KG/M2 | WEIGHT: 222 LBS | DIASTOLIC BLOOD PRESSURE: 70 MMHG | TEMPERATURE: 97.5 F | HEIGHT: 63 IN | SYSTOLIC BLOOD PRESSURE: 112 MMHG | OXYGEN SATURATION: 98 % | HEART RATE: 96 BPM

## 2025-07-17 DIAGNOSIS — H61.23 BILATERAL IMPACTED CERUMEN: Primary | ICD-10-CM

## 2025-07-17 PROCEDURE — 99213 OFFICE O/P EST LOW 20 MIN: CPT

## 2025-07-17 RX ORDER — CEFUROXIME AXETIL 500 MG/1
500 TABLET ORAL EVERY 12 HOURS SCHEDULED
COMMUNITY

## 2025-07-17 RX ORDER — CEFUROXIME AXETIL 250 MG/1
250 TABLET ORAL EVERY 12 HOURS
COMMUNITY
Start: 2025-07-14

## 2025-07-17 RX ORDER — OFLOXACIN 3 MG/ML
10 SOLUTION AURICULAR (OTIC) DAILY
COMMUNITY
Start: 2025-07-15 | End: 2025-07-22

## 2025-07-17 NOTE — PROGRESS NOTES
"Name: Divya Scott      : 1995      MRN: [id number]  Encounter Provider: Liat Hoyos DO  Encounter Date: 2025   Encounter department: [facility] [address]  :  Assessment & Plan  Bilateral impacted cerumen  -Recommend c/w abx & ear drops  -Use debrox drops so not blocked by ear wax  -Answered pt's questions to best of my ability  Orders:    carbamide peroxide (DEBROX) 6.5 % otic solution; Administer 5 drops into both ears 2 (two) times a day           History of Present Illness   F/u ear pain UC  PT has left side ear pain - Pt glands are painful on that side.    Pt did go to urgent care and was given ear drops - PT taking motrin every 3 hours for pain.   Was also given oral abx  Managing pain with Tylenol-Motrin combined  Is concerned that there is an infection being missed, but I informed pt that \"swimmers ear\" is an infx      Review of Systems   HENT:  Positive for ear pain.        Objective   /70   Pulse 96   Temp 97.5 °F (36.4 °C)   Ht 5' 2.5\" (1.588 m)   Wt 101 kg (222 lb)   SpO2 98%   BMI 39.96 kg/m²      Physical Exam  Constitutional:       General: She is not in acute distress.     Appearance: Normal appearance. She is obese. She is not ill-appearing.   HENT:      Head: Normocephalic and atraumatic.      Right Ear: There is impacted cerumen.      Left Ear: There is impacted cerumen.     Cardiovascular:      Rate and Rhythm: Normal rate and regular rhythm.      Heart sounds: Normal heart sounds.   Pulmonary:      Effort: Pulmonary effort is normal.      Breath sounds: Normal breath sounds.     Neurological:      Mental Status: She is alert.           Liat Hoyos DO  Family Medicine    "

## 2025-07-18 ENCOUNTER — TELEPHONE (OUTPATIENT)
Age: 30
End: 2025-07-18